# Patient Record
Sex: FEMALE | Race: WHITE | NOT HISPANIC OR LATINO | URBAN - METROPOLITAN AREA
[De-identification: names, ages, dates, MRNs, and addresses within clinical notes are randomized per-mention and may not be internally consistent; named-entity substitution may affect disease eponyms.]

---

## 2019-10-01 ENCOUNTER — OFFICE VISIT (OUTPATIENT)
Dept: ORTHOPEDICS | Facility: CLINIC | Age: 39
End: 2019-10-01
Payer: COMMERCIAL

## 2019-10-01 ENCOUNTER — ANCILLARY PROCEDURE (OUTPATIENT)
Dept: GENERAL RADIOLOGY | Facility: CLINIC | Age: 39
End: 2019-10-01
Attending: FAMILY MEDICINE
Payer: COMMERCIAL

## 2019-10-01 DIAGNOSIS — M76.32 IT BAND SYNDROME, LEFT: Primary | ICD-10-CM

## 2019-10-01 DIAGNOSIS — M25.562 ACUTE PAIN OF LEFT KNEE: ICD-10-CM

## 2019-10-01 DIAGNOSIS — M25.562 LEFT KNEE PAIN: ICD-10-CM

## 2019-10-01 PROCEDURE — 73562 X-RAY EXAM OF KNEE 3: CPT

## 2019-10-01 PROCEDURE — 99203 OFFICE O/P NEW LOW 30 MIN: CPT | Performed by: FAMILY MEDICINE

## 2019-10-01 NOTE — PATIENT INSTRUCTIONS
Diagnosis: Hamstring tendon strain and IT Band syndrome  Image Findings: negative knee Xray  Treatment: home exercises  Job: no restrictions  Medications: Limited tylenol/ibuprofen for pain for 1-2 weeks  Follow-up: As needed if pain not improved in 1-2 months     Yoga Strap  3 sets of 30 sec on each side twice daily

## 2019-10-01 NOTE — LETTER
10/1/2019         RE: Coral Edwards  [address] [address]        Dear Colleague,    Thank you for referring your patient, Coral Edwards, to the [facility] [place]. Please see a copy of my visit note below.    ASSESSMENT & PLAN  Coral was seen today for pain.    Diagnoses and all orders for this visit:    It band syndrome, left    Acute pain of left knee  -     XR Knee Standing AP Bilat Mamou Bilat Lat Left; Future      Patient is a 39 year old female presenting for  evaluation of   Chief Complaint   Patient presents with     Left Knee - Pain      # Lateral/Posterior Knee Pain:  Notable after jumping on boyfriend's back with posterior lateral knee. Pos Green test and pain with palpation over biceps femoris tendon likely hamstring strain and IT band syndrome.  Counseled patient on nature of condition and treatment options.  Given this plan as below, follow-up as needed in 1-2 months  Treatment: Relative rest  Physical Therapy HEP including quad/hamstring stretching  Injection none, can consider if pain not improving  Medications  Limited NSAIDs/Tylenol    Concerning signs/sx that would warrant urgent evaluation were discussed.  All questions were answered, patient understands and agrees with plan.      Return in about 1 month (around 11/1/2019).    -----    SUBJECTIVE  Coral Edwards is a/an 39 year old female who is seen as a self referral, AIC for evaluation of left knee pain. The patient is seen by themselves.    Onset: 2 month(s) ago. Patient describes injury as hiking and went to jump and felt a pop in her knee.  1 month later bowling in [place] and felt another pop in her knee pain radiated up lateral thigh.   Location of Pain: left knee   Rating of Pain at worst: 9/10  Rating of Pain Currently: 4/10  Worsened by: kneeling, child's pose (yoga), sit to stand to transition   Better with: ice, heat,  Treatments tried: ice, heat, exercise, NSAIDs, knee  brace  Associated symptoms: buckling, stiffness, pain, patellar instability    Orthopedic history: NO  Relevant surgical history: NO  Social History:  digital advertising   History reviewed. No pertinent past medical history.  Social History     Socioeconomic History     Marital status: Single     Spouse name: Not on file     Number of children: Not on file     Years of education: Not on file     Highest education level: Not on file   Occupational History     Not on file   Social Needs     Financial resource strain: Not on file     Food insecurity:     Worry: Not on file     Inability: Not on file     Transportation needs:     Medical: Not on file     Non-medical: Not on file   Tobacco Use     Smoking status: Not on file   Substance and Sexual Activity     Alcohol use: Not on file     Drug use: Not on file     Sexual activity: Not on file   Lifestyle     Physical activity:     Days per week: Not on file     Minutes per session: Not on file     Stress: Not on file   Relationships     Social connections:     Talks on phone: Not on file     Gets together: Not on file     Attends Hoahaoism service: Not on file     Active member of club or organization: Not on file     Attends meetings of clubs or organizations: Not on file     Relationship status: Not on file     Intimate partner violence:     Fear of current or ex partner: Not on file     Emotionally abused: Not on file     Physically abused: Not on file     Forced sexual activity: Not on file   Other Topics Concern     Not on file   Social History Narrative     Not on file     Patient's past medical, surgical, social, and family histories were reviewed today and no changes are noted.  No family history pertinent to the patient's problem today    REVIEW OF SYSTEMS:  10 point ROS is negative other than symptoms noted above in HPI, Past Medical History or as stated below  Constitutional: NEGATIVE for fever, chills, change in weight  Skin: NEGATIVE for  worrisome rashes, moles or lesions  GI/: NEGATIVE for bowel or bladder changes  Neuro: NEGATIVE for weakness, dizziness or paresthesias    OBJECTIVE:  BP (P) 122/69    General: healthy, alert and in no distress  HEENT: no scleral icterus or conjunctival erythema  Skin: no suspicious lesions or rash. No jaundice.  CV: no pedal edema  Resp: normal respiratory effort without conversational dyspnea   Psych: normal mood and affect  Gait: normal steady gait with appropriate coordination and balance  Neuro: Normal light sensory exam of lower extremity  MSK:  LEFT KNEE  Inspection:    normal alignment   Palpation:    Tender about the distal IT band, distal hamstring tendons. Remainder of bony and ligamentous landmarks are nontender.    No effusion is present    Patellofemoral crepitus is Present  Range of Motion:     00 extension to 1350 flexion  Strength:    Quadriceps 5/5, hamstrings 5-/5, painful, gastrocsoleus 5/5 and tibialis anterior 5/5    Extensor mechanism intact  Special Tests:    Positive: Noble test positive    Negative: Patellar grind, MCL/valgus stress (0 & 30 deg), LCL/varus stress (0 & 30 deg), Lachman's, anterior drawer, posterior drawer    Independent visualization of the below image:  No results found for this or any previous visit (from the past 24 hour(s)).    I ordered, visualized and reviewed these images with the patient  No significant arthritis    Patient's conditions were thoroughly discussed during today's visit with greater than 50% of the visit spent counseling the patient with total time spent face-to-face with the patient being 30 minutes.    Jerad Love MD, [facility]      Again, thank you for allowing me to participate in the care of your patient.        Sincerely,        Jerad Love MD

## 2019-10-01 NOTE — PROGRESS NOTES
ASSESSMENT & PLAN  Coral was seen today for pain.    Diagnoses and all orders for this visit:    It band syndrome, left    Acute pain of left knee  -     XR Knee Standing AP Bilat Nocatee Bilat Lat Left; Future      Patient is a 39 year old female presenting for  evaluation of   Chief Complaint   Patient presents with     Left Knee - Pain      # Lateral/Posterior Knee Pain:  Notable after jumping on boyfriend's back with posterior lateral knee. Pos Green test and pain with palpation over biceps femoris tendon likely hamstring strain and IT band syndrome.  Counseled patient on nature of condition and treatment options.  Given this plan as below, follow-up as needed in 1-2 months  Treatment: Relative rest  Physical Therapy HEP including quad/hamstring stretching  Injection none, can consider if pain not improving  Medications  Limited NSAIDs/Tylenol    Concerning signs/sx that would warrant urgent evaluation were discussed.  All questions were answered, patient understands and agrees with plan.      Return in about 1 month (around 11/1/2019).    -----    SUBJECTIVE  Coral Edwards is a/an 39 year old female who is seen as a self referral, AIC for evaluation of left knee pain. The patient is seen by themselves.    Onset: 2 month(s) ago. Patient describes injury as hiking and went to jump and felt a pop in her knee.  1 month later bowling in [place] and felt another pop in her knee pain radiated up lateral thigh.   Location of Pain: left knee   Rating of Pain at worst: 9/10  Rating of Pain Currently: 4/10  Worsened by: kneeling, child's pose (yoga), sit to stand to transition   Better with: ice, heat,  Treatments tried: ice, heat, exercise, NSAIDs, knee brace  Associated symptoms: buckling, stiffness, pain, patellar instability    Orthopedic history: NO  Relevant surgical history: NO  Social History:  digital advertising   History reviewed. No pertinent past medical history.  Social History      Socioeconomic History     Marital status: Single     Spouse name: Not on file     Number of children: Not on file     Years of education: Not on file     Highest education level: Not on file   Occupational History     Not on file   Social Needs     Financial resource strain: Not on file     Food insecurity:     Worry: Not on file     Inability: Not on file     Transportation needs:     Medical: Not on file     Non-medical: Not on file   Tobacco Use     Smoking status: Not on file   Substance and Sexual Activity     Alcohol use: Not on file     Drug use: Not on file     Sexual activity: Not on file   Lifestyle     Physical activity:     Days per week: Not on file     Minutes per session: Not on file     Stress: Not on file   Relationships     Social connections:     Talks on phone: Not on file     Gets together: Not on file     Attends Rastafari service: Not on file     Active member of club or organization: Not on file     Attends meetings of clubs or organizations: Not on file     Relationship status: Not on file     Intimate partner violence:     Fear of current or ex partner: Not on file     Emotionally abused: Not on file     Physically abused: Not on file     Forced sexual activity: Not on file   Other Topics Concern     Not on file   Social History Narrative     Not on file     Patient's past medical, surgical, social, and family histories were reviewed today and no changes are noted.  No family history pertinent to the patient's problem today    REVIEW OF SYSTEMS:  10 point ROS is negative other than symptoms noted above in HPI, Past Medical History or as stated below  Constitutional: NEGATIVE for fever, chills, change in weight  Skin: NEGATIVE for worrisome rashes, moles or lesions  GI/: NEGATIVE for bowel or bladder changes  Neuro: NEGATIVE for weakness, dizziness or paresthesias    OBJECTIVE:  BP (P) 122/69    General: healthy, alert and in no distress  HEENT: no scleral icterus or conjunctival  erythema  Skin: no suspicious lesions or rash. No jaundice.  CV: no pedal edema  Resp: normal respiratory effort without conversational dyspnea   Psych: normal mood and affect  Gait: normal steady gait with appropriate coordination and balance  Neuro: Normal light sensory exam of lower extremity  MSK:  LEFT KNEE  Inspection:    normal alignment   Palpation:    Tender about the distal IT band, distal hamstring tendons. Remainder of bony and ligamentous landmarks are nontender.    No effusion is present    Patellofemoral crepitus is Present  Range of Motion:     00 extension to 1350 flexion  Strength:    Quadriceps 5/5, hamstrings 5-/5, painful, gastrocsoleus 5/5 and tibialis anterior 5/5    Extensor mechanism intact  Special Tests:    Positive: Noble test positive    Negative: Patellar grind, MCL/valgus stress (0 & 30 deg), LCL/varus stress (0 & 30 deg), Lachman's, anterior drawer, posterior drawer    Independent visualization of the below image:  No results found for this or any previous visit (from the past 24 hour(s)).    I ordered, visualized and reviewed these images with the patient  No significant arthritis    Patient's conditions were thoroughly discussed during today's visit with greater than 50% of the visit spent counseling the patient with total time spent face-to-face with the patient being 30 minutes.    Jerad Love MD, [facility]

## 2019-12-11 ENCOUNTER — TELEPHONE (OUTPATIENT)
Dept: TRANSPLANT | Facility: CLINIC | Age: 39
End: 2019-12-11

## 2019-12-11 NOTE — TELEPHONE ENCOUNTER
"Close Window   Print This Page   Expand All  Collapse All  [facility] BREEZE  [id number]      LIVING LIVER DONOR EVALUATION  Donor First Name Coral Donor MRN    Donor Middle Name  Completed 2019 1:01 PM   Donor Last Name Grace Record Id [id number]    1980     BREEZE Screen PASSED     Intended Recipient  Recipient First Name [place] Relationship Not related (Other)   Recipient Last Name Hay Recipient Diagnosis    Recipient   Recipient Insurance Provider    Recipient MRN  Recipient Insurance Type    Recipient Height      Recipient Weight      Recipient's ABO      Donor Information  Age 39 Race    Ht 173 cm (5' 8'') Ethnicity Not /   Wt 68.0 kg (150 lbs) Preferred Language English   BMI 22.80 kg/m   Required No   Gender Female Blood Type O   Demographics  Home Address [address] [place] [address] Maryse WIGGINS [facility] # [phone]   [facility] Type [facility] Alternate #    Zip Code [address] Type    Country [place] Preferred Contact day Mon, Fri, Wed   Email [email or web address] Preferred Contact time 1:00 PM-4:00 PM   &&   Donor's Medical Information  Medical History Bacterial Vaginosis   Depression   Seasonal Affective Disorder Medications Metronidazole Tablets   Sertraline   Surgical History Appendectomy    Allergies Amoxicillin : Nausea and/or Vomiting, Wheezing and/or Bronchospasm   Codeine : Rash, Edema, Wheezing and/or Bronchospasm, Nausea and/or Vomiting   Contrast (X-Ray Dye) : Wheezing and/or Bronchospasm, Rash, Nausea and/or Vomiting, Edema   IVP dye : Rash, Edema, Nausea and/or Vomiting, Wheezing and/or Bronchospasm   Social History EtOH: Rare (1-2 drinks/month)   Illicit Drug Use: Denies   Tobacco: Current (1/4 ppd x 15 years) Self-Reported Functional Status \"I am able to participate in strenuous sports such as swimming, singles tennis, football, basketball, or skiing\"   Family Medical History Cancer (denies)   Clots or Clotting Disease " (denies)   Diabetes (Sibling, Mother, Aunt or Uncle, Cousin)   Heart Disease (denies)   Hypertension (denies)   Inflammatory Bowel Disease (denies)   Liver Disease (denies)   Mental Illness (Grandparent, Mother) Exercise Frequency Exercise (>3 times per week)   Review of Organ Systems  Review of Systems Airway or Lungs: No   Blood Disorder: No   Cancer: No   Diabetes,Thyroid,Adrenal,Endocrine Disorder: No   Digestive or Liver: No   Female Health: No   Heart or Circulatory System: No   Immune Diseases: No   Kidneys and Bladder: No   Muscles,Bones,Joints: No   Neuro: No   Psych: Yes   &&   Donor's Social Information  Marital Status Single Level of Education College or baccalaureate degree complete   Medical Insurance Status Has medical insurance Employment Status Full Time   Living Accommodation Owns own home/apartment Employer [employer]   Living Arrangement With relatives other than spouse, parents Occupation    High Risk Behaviors Blood transfusion < 12 months. (NO)   Commercial sex < 12 months. (NO)   Illicit IV drug use < 5yrs. (NO)   Other high risk sexual contact < 12 months. (NO)     Reason for Donation  Referral Tx Candidate Reason for Donation Because i can. I m O-, the liver regenerates, and someone i know wants to live.   Permission to Disclose Inquiry  Patient Comments    Donor Motivation Highly motivated donor     PCP Contact  PCP Name    PCP [facility]    PCP State    PCP Phone    Emergency Contact  First Name Duke First Name Dinah   Last Name Aicha Last Name Grace   Phone # [phone] Phone # [phone]   Phone Type Mobile Phone Type Mobile   Relationship Friend or Other Relationship Mother   Office Use  Reviewed By    Reviewed 12/11/2019   Admin Folder Archive   Comments    Lost for Followup    Extended Comments    BREEZE ID [id number]   survey status completed   Activity History  Call  Task    Due Date 12/4/2019   Last Modified Date/Time  12/4/2019 2:26 PM   Comments

## 2019-12-16 ENCOUNTER — MEDICAL CORRESPONDENCE (OUTPATIENT)
Dept: HEALTH INFORMATION MANAGEMENT | Facility: CLINIC | Age: 39
End: 2019-12-16

## 2019-12-17 DIAGNOSIS — Z00.5 TRANSPLANT DONOR EVALUATION: Primary | ICD-10-CM

## 2019-12-20 ENCOUNTER — TELEPHONE (OUTPATIENT)
Dept: TRANSPLANT | Facility: CLINIC | Age: 39
End: 2019-12-20

## 2019-12-20 NOTE — TELEPHONE ENCOUNTER
I called and left a message for Coral to discuss her interest in being a liver donor for a high school friend.  I let her know that I would be out of the office until 1/3/20.  I left Liset wren's name and number for her to reach next week on Tuesday or Friday.      WILLIE Woo, [facility]  Clinical  and Independent Donor Advocate  [facility]  Pager:  [phone]  Direct:  [phone]

## 2020-02-24 DIAGNOSIS — Z00.5 TRANSPLANT DONOR EVALUATION: ICD-10-CM

## 2020-02-24 LAB
ALBUMIN SERPL-MCNC: 4.5 G/DL (ref 3.4–5)
ALP SERPL-CCNC: 72 U/L (ref 40–150)
ALT SERPL W P-5'-P-CCNC: 18 U/L (ref 0–50)
AMYLASE SERPL-CCNC: 51 U/L (ref 30–110)
ANION GAP SERPL CALCULATED.3IONS-SCNC: 7 MMOL/L (ref 3–14)
APTT PPP: 32 SEC (ref 22–37)
AST SERPL W P-5'-P-CCNC: 17 U/L (ref 0–45)
BILIRUB SERPL-MCNC: 1.3 MG/DL (ref 0.2–1.3)
BUN SERPL-MCNC: 10 MG/DL (ref 7–30)
CALCIUM SERPL-MCNC: 9.3 MG/DL (ref 8.5–10.1)
CHLORIDE SERPL-SCNC: 105 MMOL/L (ref 94–109)
CHOLEST SERPL-MCNC: 160 MG/DL
CO2 SERPL-SCNC: 24 MMOL/L (ref 20–32)
CREAT SERPL-MCNC: 0.82 MG/DL (ref 0.52–1.04)
ERYTHROCYTE [DISTWIDTH] IN BLOOD BY AUTOMATED COUNT: 12.8 % (ref 10–15)
GFR SERPL CREATININE-BSD FRML MDRD: 89 ML/MIN/{1.73_M2}
GLUCOSE SERPL-MCNC: 75 MG/DL (ref 70–99)
HCT VFR BLD AUTO: 40.4 % (ref 35–47)
HDLC SERPL-MCNC: 43 MG/DL
HGB BLD-MCNC: 13.4 G/DL (ref 11.7–15.7)
INR PPP: 1.05 (ref 0.86–1.14)
LDLC SERPL CALC-MCNC: 105 MG/DL
MCH RBC QN AUTO: 30.5 PG (ref 26.5–33)
MCHC RBC AUTO-ENTMCNC: 33.2 G/DL (ref 31.5–36.5)
MCV RBC AUTO: 92 FL (ref 78–100)
NONHDLC SERPL-MCNC: 117 MG/DL
PLATELET # BLD AUTO: 262 10E9/L (ref 150–450)
POTASSIUM SERPL-SCNC: 3.7 MMOL/L (ref 3.4–5.3)
PROT SERPL-MCNC: 8.4 G/DL (ref 6.8–8.8)
RBC # BLD AUTO: 4.4 10E12/L (ref 3.8–5.2)
SODIUM SERPL-SCNC: 136 MMOL/L (ref 133–144)
TRIGL SERPL-MCNC: 60 MG/DL
WBC # BLD AUTO: 9 10E9/L (ref 4–11)

## 2020-02-24 PROCEDURE — 85027 COMPLETE CBC AUTOMATED: CPT | Performed by: TRANSPLANT SURGERY

## 2020-02-24 PROCEDURE — 85610 PROTHROMBIN TIME: CPT | Performed by: TRANSPLANT SURGERY

## 2020-02-24 PROCEDURE — 36415 COLL VENOUS BLD VENIPUNCTURE: CPT | Performed by: TRANSPLANT SURGERY

## 2020-02-24 PROCEDURE — 80061 LIPID PANEL: CPT | Performed by: TRANSPLANT SURGERY

## 2020-02-24 PROCEDURE — 80053 COMPREHEN METABOLIC PANEL: CPT | Performed by: TRANSPLANT SURGERY

## 2020-02-24 PROCEDURE — 82150 ASSAY OF AMYLASE: CPT | Performed by: TRANSPLANT SURGERY

## 2020-02-24 PROCEDURE — 86900 BLOOD TYPING SEROLOGIC ABO: CPT | Performed by: TRANSPLANT SURGERY

## 2020-02-24 PROCEDURE — 85730 THROMBOPLASTIN TIME PARTIAL: CPT | Performed by: TRANSPLANT SURGERY

## 2020-02-25 ENCOUNTER — TELEPHONE (OUTPATIENT)
Dept: TRANSPLANT | Facility: CLINIC | Age: 40
End: 2020-02-25

## 2020-02-25 LAB
ABO + RH BLD: NORMAL
ABO + RH BLD: NORMAL
SPECIMEN EXP DATE BLD: NORMAL

## 2020-02-25 NOTE — TELEPHONE ENCOUNTER
[facility]  Initial Telephone Psychosocial Screening  Living Organ Donation   Organ Type: unrelated, liver  Presenting Information:  Ms. Coral Lira presents to the [facility] since she is interested in becoming a potential liver donor to her long time friend, Mr. William Guido, age 42.  I have been asked to complete an initial telephone psychosocial screening per our living liver donor evaluation protocol.      PERSONAL BACKGROUND:  Current Living Situation: Ms. Lira lives in a single family home that she owns in [place], [place] with her 19 year old son and 22 year old daughter.      Education/Employment/Financial Situation: Ms. Lira completed a bachelor's degree.  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  She has paid sick leave, paid vacation leave, and short term disability insurance.  She denies that she would suffer a significant financial hardship as a result of living liver donaiton.    Family History: Ms. Lira is single.  She does have a boyfriend who lives near her.  She has two children, a 19 year old son and a 22 year old daughter.  Both of her children are in college and living at home with her.    Mental Health: Ms. Lira has seasonal affective disorder.  She tends to get depressive symptoms primarily in the winter.  She takes sertraline and sees a psychotherapist to treat her symptoms.  Ms. Lira denies that her symptoms interfear with her job or her relationships.  Ms. lira denies any history of suicidal thinking, plans, or past attempts.  Ms. Lira denies any history of psychiatric hospitalization.    Alcohol and Drug Use/Abuse/Dependency: Denies any past history of abuse or dependency on alcohol or illicit drugs. Denies any current use of street drugs, including marijuana.  Denies any past history of negative consequences of use of alcohol or drugs such  as a DUI, relationship problems, or problems with work or home life.   Ms. Edwards reports that she consumes alcohol 1 to 2 times per month at most.    Cigarette Use: 1/4 pack per day for 15 years.  Ms. Edwards states that she is willing to quit.  We discussed working with her primary care physician on smoking cessation.      Legal: denies    Coping Strategies/Support System: Ms. Edwards's support system includes her mother, her boyfriend, and her two young adult children.    DONOR SPECIFIC INFORMATION:  Relationship to Recipient: Ms. Edwards knows Mr. Guido from middle school.  They recently reconnected via Facebook.      Decision Process/Motivation to Donate: Ms. Edwards was evaluated and approved as a liver donor for her father seven years ago.  However, Ms. Edwards's father became very sick and  before she could donate to him.  When Ms. Edwards found out about her friend, she wanted to see if she could help.  Ms. Edwards denies feeling any pressure or coercion to be a donor.  Ms. Edwards denies being offered any form of payment to be a donor.     Impressions/Recommendations:   Ms. Coral Edwards  is highly motivated to donate a lobe of her liver to her friend,  Mr. William Guido, age 42.  Her decision to donate is free of inducement, coercion, or other undue pressure.   Her housing, finances and employment are stable.  No current/active mental health or chemical abuse issues were identified.  The need for a caregiver was reviewed and she is able to identify a plan to meet her post operative care needs.  Ms. Edwards appears capable of making an informed medical decision.  No psychosocial contraindications to living organ donation were identified and  I support Ms. Edwards s desire to continue the process of being evaluated as a potential living liver donor for her friend,  Mr. William Guido, age 42.     Contact Information:   WILLIE Woo, [facility]  Clinical  and Independent  Donor Advocate  [facility]  Pager:  [phone]  Direct:  [phone]    Time Spent: 25 minutes

## 2020-03-04 ENCOUNTER — TELEPHONE (OUTPATIENT)
Dept: TRANSPLANT | Facility: CLINIC | Age: 40
End: 2020-03-04

## 2020-03-04 NOTE — TELEPHONE ENCOUNTER
Informed Coral that we can now sched her for eval. Clarified her allergies--when in high school, the Dr's thought she may have had a kidney stone. They started the dye and her airway began to close off and she was vomiting blood. Per Donor Meeting, the Dr's suggested MRI/MRA. Also was suggested getting premedicated. Now told her that. States she's smoking. Will need PFT's.Enc to stop smoking.

## 2020-03-05 DIAGNOSIS — Z00.5 TRANSPLANT DONOR EVALUATION: ICD-10-CM

## 2020-03-12 ENCOUNTER — TELEPHONE (OUTPATIENT)
Dept: TRANSPLANT | Facility: CLINIC | Age: 40
End: 2020-03-12

## 2020-04-23 ENCOUNTER — VIRTUAL VISIT (OUTPATIENT)
Dept: TRANSPLANT | Facility: CLINIC | Age: 40
End: 2020-04-23
Attending: TRANSPLANT SURGERY

## 2020-04-23 ENCOUNTER — APPOINTMENT (OUTPATIENT)
Dept: TRANSPLANT | Facility: CLINIC | Age: 40
End: 2020-04-23
Attending: INTERNAL MEDICINE

## 2020-04-23 DIAGNOSIS — Z00.5 TRANSPLANT DONOR EVALUATION: Primary | ICD-10-CM

## 2020-04-23 DIAGNOSIS — Z52.6 LIVER DONOR: Primary | ICD-10-CM

## 2020-04-23 NOTE — PROGRESS NOTES
Patient opted to stop the evaluation.  She is having lots of work stress and personal medical and mental health issues related to a recent mauling by a dog.      WILLIE Woo, [facility]  Clinical  and Independent Donor Advocate  [facility]  Pager:  [phone]  Direct:  [phone]

## 2020-04-23 NOTE — PROGRESS NOTES
Tried calling one last time before appointment, I was unsuccessful at reaching patient to set up virtual visit.    Marlee Holland CMA on 4/23/2020 at 3:14 PM

## 2020-04-23 NOTE — PROGRESS NOTES
Left voicemail to call back to set up virtual visit. I will try again before appointment time.  Marlee Holland, CMA on 4/23/2020 at 1:12 PM

## 2020-04-27 ENCOUNTER — COMMITTEE REVIEW (OUTPATIENT)
Dept: TRANSPLANT | Facility: CLINIC | Age: 40
End: 2020-04-27

## 2020-04-27 NOTE — COMMITTEE REVIEW
Abdominal Committee Review Note     Evaluation Date:   Committee Review Date: 4/27/2020    Organ being evaluated for: Liver    Transplant Phase:   Transplant Status:     Transplant Coordinator: No matching coordinators  Transplant Surgeon:       Referring Physician: Referred Self    Primary Diagnosis:   Secondary Diagnosis:     Committee Review Members:   - Clinical Bree Philip, [facility]   Transplant Meghan Moncada RN, Jaqueline Cao RN, Alice Calhoun LPN, Cordell Sandoval MD, Josiah Vallejo MD       Transplant Eligibility: Age 18 yrs to 50 yrs, Body Mass Index (BMI) < 30 kg/m2    Committee Review Decision: Declined    Relative Contraindications: Other, donor withdrew    Absolute Contraindications: donor withdrew    Committee Chair Cordell Sandoval MD verbally attested to the committee's decision.    Committee Discussion Details: Donor withdrew during evaluation due to trauma she is still dealing with after being attacked by a dog.  She might consider donation at a different time.

## 2024-06-21 ENCOUNTER — DOCUMENTATION ONLY (OUTPATIENT)
Dept: TRANSPLANT | Facility: CLINIC | Age: 44
End: 2024-06-21

## 2024-06-26 ENCOUNTER — TELEPHONE (OUTPATIENT)
Dept: TRANSPLANT | Facility: CLINIC | Age: 44
End: 2024-06-26

## 2024-06-26 NOTE — TELEPHONE ENCOUNTER
LATASHA called Coral for her scheduled call. She states that she is on the way to a court hearing and cannot talk. Will call Alice to reschedule.     Ann Davis, [facility], CCTSW   Independent Living Donor Advocate  [facility]  Direct: [phone]  E-Mail: [email or web address]

## 2024-06-27 ENCOUNTER — DOCUMENTATION ONLY (OUTPATIENT)
Dept: TRANSPLANT | Facility: CLINIC | Age: 44
End: 2024-06-27